# Patient Record
Sex: FEMALE | Race: WHITE | NOT HISPANIC OR LATINO | URBAN - METROPOLITAN AREA
[De-identification: names, ages, dates, MRNs, and addresses within clinical notes are randomized per-mention and may not be internally consistent; named-entity substitution may affect disease eponyms.]

---

## 2020-02-16 ENCOUNTER — INPATIENT (INPATIENT)
Facility: HOSPITAL | Age: 80
LOS: 1 days | Discharge: HOME | DRG: 69 | End: 2020-02-18
Attending: HOSPITALIST | Admitting: HOSPITALIST
Payer: MEDICARE

## 2020-02-16 VITALS
HEART RATE: 62 BPM | OXYGEN SATURATION: 90 % | TEMPERATURE: 98 F | HEIGHT: 68 IN | DIASTOLIC BLOOD PRESSURE: 61 MMHG | SYSTOLIC BLOOD PRESSURE: 106 MMHG | WEIGHT: 164.91 LBS | RESPIRATION RATE: 16 BRPM

## 2020-02-16 DIAGNOSIS — G93.41 METABOLIC ENCEPHALOPATHY: ICD-10-CM

## 2020-02-16 LAB
ADD ON TEST-SPECIMEN IN LAB: SIGNIFICANT CHANGE UP
ADD ON TEST-SPECIMEN IN LAB: SIGNIFICANT CHANGE UP
ALBUMIN SERPL ELPH-MCNC: 3.5 G/DL — SIGNIFICANT CHANGE UP (ref 3.3–5)
ALP SERPL-CCNC: 66 U/L — SIGNIFICANT CHANGE UP (ref 40–120)
ALT FLD-CCNC: 19 U/L — SIGNIFICANT CHANGE UP (ref 12–78)
ANION GAP SERPL CALC-SCNC: 8 MMOL/L — SIGNIFICANT CHANGE UP (ref 5–17)
APTT BLD: 25.7 SEC — LOW (ref 27.5–36.3)
AST SERPL-CCNC: 25 U/L — SIGNIFICANT CHANGE UP (ref 15–37)
BASOPHILS # BLD AUTO: 0.03 K/UL — SIGNIFICANT CHANGE UP (ref 0–0.2)
BASOPHILS NFR BLD AUTO: 0.3 % — SIGNIFICANT CHANGE UP (ref 0–2)
BILIRUB SERPL-MCNC: 0.3 MG/DL — SIGNIFICANT CHANGE UP (ref 0.2–1.2)
BUN SERPL-MCNC: 17 MG/DL — SIGNIFICANT CHANGE UP (ref 7–23)
CALCIUM SERPL-MCNC: 8.9 MG/DL — SIGNIFICANT CHANGE UP (ref 8.5–10.1)
CHLORIDE SERPL-SCNC: 110 MMOL/L — HIGH (ref 96–108)
CK SERPL-CCNC: 225 U/L — HIGH (ref 26–192)
CO2 SERPL-SCNC: 23 MMOL/L — SIGNIFICANT CHANGE UP (ref 22–31)
CREAT SERPL-MCNC: 0.78 MG/DL — SIGNIFICANT CHANGE UP (ref 0.5–1.3)
EOSINOPHIL # BLD AUTO: 0.39 K/UL — SIGNIFICANT CHANGE UP (ref 0–0.5)
EOSINOPHIL NFR BLD AUTO: 3.8 % — SIGNIFICANT CHANGE UP (ref 0–6)
GLUCOSE SERPL-MCNC: 152 MG/DL — HIGH (ref 70–99)
HCT VFR BLD CALC: 41.7 % — SIGNIFICANT CHANGE UP (ref 34.5–45)
HGB BLD-MCNC: 13.9 G/DL — SIGNIFICANT CHANGE UP (ref 11.5–15.5)
IMM GRANULOCYTES NFR BLD AUTO: 0.2 % — SIGNIFICANT CHANGE UP (ref 0–1.5)
INR BLD: 0.93 RATIO — SIGNIFICANT CHANGE UP (ref 0.88–1.16)
LYMPHOCYTES # BLD AUTO: 4.35 K/UL — HIGH (ref 1–3.3)
LYMPHOCYTES # BLD AUTO: 42 % — SIGNIFICANT CHANGE UP (ref 13–44)
MCHC RBC-ENTMCNC: 32.1 PG — SIGNIFICANT CHANGE UP (ref 27–34)
MCHC RBC-ENTMCNC: 33.3 GM/DL — SIGNIFICANT CHANGE UP (ref 32–36)
MCV RBC AUTO: 96.3 FL — SIGNIFICANT CHANGE UP (ref 80–100)
MONOCYTES # BLD AUTO: 1.35 K/UL — HIGH (ref 0–0.9)
MONOCYTES NFR BLD AUTO: 13 % — SIGNIFICANT CHANGE UP (ref 2–14)
NEUTROPHILS # BLD AUTO: 4.21 K/UL — SIGNIFICANT CHANGE UP (ref 1.8–7.4)
NEUTROPHILS NFR BLD AUTO: 40.7 % — LOW (ref 43–77)
PLATELET # BLD AUTO: 198 K/UL — SIGNIFICANT CHANGE UP (ref 150–400)
POTASSIUM SERPL-MCNC: 3.5 MMOL/L — SIGNIFICANT CHANGE UP (ref 3.5–5.3)
POTASSIUM SERPL-SCNC: 3.5 MMOL/L — SIGNIFICANT CHANGE UP (ref 3.5–5.3)
PROT SERPL-MCNC: 7 GM/DL — SIGNIFICANT CHANGE UP (ref 6–8.3)
PROTHROM AB SERPL-ACNC: 10.3 SEC — SIGNIFICANT CHANGE UP (ref 10–12.9)
RBC # BLD: 4.33 M/UL — SIGNIFICANT CHANGE UP (ref 3.8–5.2)
RBC # FLD: 12.6 % — SIGNIFICANT CHANGE UP (ref 10.3–14.5)
SODIUM SERPL-SCNC: 141 MMOL/L — SIGNIFICANT CHANGE UP (ref 135–145)
TROPONIN I SERPL-MCNC: <0.015 NG/ML — SIGNIFICANT CHANGE UP (ref 0.01–0.04)
WBC # BLD: 10.35 K/UL — SIGNIFICANT CHANGE UP (ref 3.8–10.5)
WBC # FLD AUTO: 10.35 K/UL — SIGNIFICANT CHANGE UP (ref 3.8–10.5)

## 2020-02-16 PROCEDURE — 99222 1ST HOSP IP/OBS MODERATE 55: CPT | Mod: AI

## 2020-02-16 PROCEDURE — 80307 DRUG TEST PRSMV CHEM ANLYZR: CPT

## 2020-02-16 PROCEDURE — 82607 VITAMIN B-12: CPT

## 2020-02-16 PROCEDURE — 36415 COLL VENOUS BLD VENIPUNCTURE: CPT

## 2020-02-16 PROCEDURE — 85025 COMPLETE CBC W/AUTO DIFF WBC: CPT

## 2020-02-16 PROCEDURE — 97116 GAIT TRAINING THERAPY: CPT | Mod: GP

## 2020-02-16 PROCEDURE — 71045 X-RAY EXAM CHEST 1 VIEW: CPT | Mod: 26

## 2020-02-16 PROCEDURE — 97162 PT EVAL MOD COMPLEX 30 MIN: CPT | Mod: GP

## 2020-02-16 PROCEDURE — 70450 CT HEAD/BRAIN W/O DYE: CPT | Mod: 26,59

## 2020-02-16 PROCEDURE — 82140 ASSAY OF AMMONIA: CPT

## 2020-02-16 PROCEDURE — 82746 ASSAY OF FOLIC ACID SERUM: CPT

## 2020-02-16 PROCEDURE — 80053 COMPREHEN METABOLIC PANEL: CPT

## 2020-02-16 PROCEDURE — 70498 CT ANGIOGRAPHY NECK: CPT | Mod: 26

## 2020-02-16 PROCEDURE — 84100 ASSAY OF PHOSPHORUS: CPT

## 2020-02-16 PROCEDURE — 70496 CT ANGIOGRAPHY HEAD: CPT | Mod: 26

## 2020-02-16 PROCEDURE — 93010 ELECTROCARDIOGRAM REPORT: CPT

## 2020-02-16 PROCEDURE — 83735 ASSAY OF MAGNESIUM: CPT

## 2020-02-16 PROCEDURE — 95819 EEG AWAKE AND ASLEEP: CPT

## 2020-02-16 PROCEDURE — 85610 PROTHROMBIN TIME: CPT

## 2020-02-16 PROCEDURE — 84484 ASSAY OF TROPONIN QUANT: CPT

## 2020-02-16 PROCEDURE — 93306 TTE W/DOPPLER COMPLETE: CPT

## 2020-02-16 PROCEDURE — 86780 TREPONEMA PALLIDUM: CPT

## 2020-02-16 RX ORDER — SODIUM CHLORIDE 9 MG/ML
500 INJECTION INTRAMUSCULAR; INTRAVENOUS; SUBCUTANEOUS
Refills: 0 | Status: DISCONTINUED | OUTPATIENT
Start: 2020-02-16 | End: 2020-02-18

## 2020-02-16 RX ORDER — ONDANSETRON 8 MG/1
4 TABLET, FILM COATED ORAL ONCE
Refills: 0 | Status: COMPLETED | OUTPATIENT
Start: 2020-02-16 | End: 2020-02-16

## 2020-02-16 RX ADMIN — ONDANSETRON 4 MILLIGRAM(S): 8 TABLET, FILM COATED ORAL at 20:30

## 2020-02-16 NOTE — ED ADULT TRIAGE NOTE - CHIEF COMPLAINT QUOTE
presents with generalized weakness / malaise / nausea, onset began 2 hours ago, from home, patient dryheaving at triage presents with generalized weakness / malaise / nausea, onset began 2 hours ago, as per ems, pt from home, patient dry heaving at triage

## 2020-02-16 NOTE — ED PROVIDER NOTE - PROGRESS NOTE DETAILS
Drew SHELL for ED attending, Dr. Álvarez: Dr. Álvarez on phone with Dr. Chance of Telestroke. Drew SHELL for ED attending, Dr. Álvarez: Received verbal report from radiology which was negative for any acute pathologies. Dr. Álvarez:  CTA verbal report: ? occlusion of small branch off L M1 a. Drew SHELL for ED attending, Dr. Álvarez: Case discussed further with Dr. Chance of Telestroke including CTA verbal report and agrees that she is not tPA candidate nor transfer for neuro interventional radiology. Advise admission for further workup including r/o seizures. Drew SHELL for ED attending, Dr. Álvarez: Case discussed further with Dr. JAKY Chance of Telestroke including CTA verbal report and agrees that she is not tPA candidate nor transfer for neuro-interventional radiology. Advise admission for further workup including r/o seizures. Drew SHELL for ED attending, Dr. Álvarez: Received verbal report from radiology for CT head noncon which was negative for any acute pathologies.

## 2020-02-16 NOTE — ED PROVIDER NOTE - CLINICAL SUMMARY MEDICAL DECISION MAKING FREE TEXT BOX
79 yr old F unknown PMH presenting to the ED BIBA from home regarding general weakness, lethargy. +nausea. Patient denies HA, CP, fever chills, SOB, meds taken today. Patient c/o feeling sick but unable to elaborate. Afebrile, BGM normal on arrival. No facial droop but R arm weakness and b/l leg weakness. Code stroke initiated. Plan: Code stroke, EKG, CXR, CT head, CTA head and neck, labs including serial troponin, NPO dysphagia, NIHSS, Telestroke consulted, IV Zofran for N/V, monitor, observe and reassess.

## 2020-02-16 NOTE — ED ADULT NURSE NOTE - CHIEF COMPLAINT QUOTE
presents with generalized weakness / malaise / nausea, onset began 2 hours ago, from home, patient dryheaving at triage

## 2020-02-16 NOTE — ED ADULT NURSE NOTE - OBJECTIVE STATEMENT
Patient brought in by ambulance with son and grandson.  Patient was seen well at 6pm and she suddenly became lethargic and dropped her head down and was not responding.  She is complaining of extreme nausea.  Patient very lethargic at times and other times is yelling at staff.  Uncooperative at times to perform full assessment.  Patient's family states this mental status is very unlike her.  She is A&Ox1 here but normally as per family is A&Ox4 and self sufficient.

## 2020-02-16 NOTE — ED ADULT NURSE NOTE - NSIMPLEMENTINTERV_GEN_ALL_ED
Implemented All Fall Risk Interventions:  Start to call system. Call bell, personal items and telephone within reach. Instruct patient to call for assistance. Room bathroom lighting operational. Non-slip footwear when patient is off stretcher. Physically safe environment: no spills, clutter or unnecessary equipment. Stretcher in lowest position, wheels locked, appropriate side rails in place. Provide visual cue, wrist band, yellow gown, etc. Monitor gait and stability. Monitor for mental status changes and reorient to person, place, and time. Review medications for side effects contributing to fall risk. Reinforce activity limits and safety measures with patient and family.

## 2020-02-16 NOTE — ED PROVIDER NOTE - CONSTITUTIONAL, MLM
normal... Elderly, white female. Awake but slightly lethargic. In no respiratory discomfort. Acutely ill, non toxic. Elderly, white female. Awake but mildly lethargic. In no respiratory discomfort. Acutely ill, non toxic.  No AOB.

## 2020-02-16 NOTE — ED PROVIDER NOTE - OBJECTIVE STATEMENT
78 y/o F with unknown PMH presenting to the ED c/o sudden onset weakness, lethargy x2 hours. Per family, patient was at home when she started to become suddenly lethargic, and falling asleep when family was talking to her. Pt reports that she just suddenly felt sick, but unable to elaborate an further and does not know what is wrong. +nausea, vomiting Last known well x2 hours, away from baseline. Afebrile on arrival. No medications PTA.  Denies abd pain, SOB, HA, CP, fever or chills. Unable to obtain full HPI secondary to patients condition.

## 2020-02-16 NOTE — STROKE CODE NOTE - SUBJECTIVE
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

## 2020-02-17 LAB
ALBUMIN SERPL ELPH-MCNC: 3.7 G/DL — SIGNIFICANT CHANGE UP (ref 3.3–5)
ALP SERPL-CCNC: 65 U/L — SIGNIFICANT CHANGE UP (ref 40–120)
ALT FLD-CCNC: 15 U/L — SIGNIFICANT CHANGE UP (ref 12–78)
AMMONIA BLD-MCNC: 17 UMOL/L — SIGNIFICANT CHANGE UP (ref 11–32)
ANION GAP SERPL CALC-SCNC: 3 MMOL/L — LOW (ref 5–17)
AST SERPL-CCNC: 19 U/L — SIGNIFICANT CHANGE UP (ref 15–37)
BASOPHILS # BLD AUTO: 0.02 K/UL — SIGNIFICANT CHANGE UP (ref 0–0.2)
BASOPHILS NFR BLD AUTO: 0.2 % — SIGNIFICANT CHANGE UP (ref 0–2)
BILIRUB SERPL-MCNC: 0.3 MG/DL — SIGNIFICANT CHANGE UP (ref 0.2–1.2)
BUN SERPL-MCNC: 14 MG/DL — SIGNIFICANT CHANGE UP (ref 7–23)
CALCIUM SERPL-MCNC: 9.4 MG/DL — SIGNIFICANT CHANGE UP (ref 8.5–10.1)
CHLORIDE SERPL-SCNC: 111 MMOL/L — HIGH (ref 96–108)
CO2 SERPL-SCNC: 28 MMOL/L — SIGNIFICANT CHANGE UP (ref 22–31)
CREAT SERPL-MCNC: 0.67 MG/DL — SIGNIFICANT CHANGE UP (ref 0.5–1.3)
EOSINOPHIL # BLD AUTO: 0.11 K/UL — SIGNIFICANT CHANGE UP (ref 0–0.5)
EOSINOPHIL NFR BLD AUTO: 1.2 % — SIGNIFICANT CHANGE UP (ref 0–6)
FOLATE SERPL-MCNC: 11.6 NG/ML — SIGNIFICANT CHANGE UP
GLUCOSE SERPL-MCNC: 107 MG/DL — HIGH (ref 70–99)
HCT VFR BLD CALC: 42.2 % — SIGNIFICANT CHANGE UP (ref 34.5–45)
HGB BLD-MCNC: 13.9 G/DL — SIGNIFICANT CHANGE UP (ref 11.5–15.5)
IMM GRANULOCYTES NFR BLD AUTO: 0.4 % — SIGNIFICANT CHANGE UP (ref 0–1.5)
INR BLD: 0.94 RATIO — SIGNIFICANT CHANGE UP (ref 0.88–1.16)
LYMPHOCYTES # BLD AUTO: 1.84 K/UL — SIGNIFICANT CHANGE UP (ref 1–3.3)
LYMPHOCYTES # BLD AUTO: 19.8 % — SIGNIFICANT CHANGE UP (ref 13–44)
MAGNESIUM SERPL-MCNC: 2.1 MG/DL — SIGNIFICANT CHANGE UP (ref 1.6–2.6)
MCHC RBC-ENTMCNC: 31.6 PG — SIGNIFICANT CHANGE UP (ref 27–34)
MCHC RBC-ENTMCNC: 32.9 GM/DL — SIGNIFICANT CHANGE UP (ref 32–36)
MCV RBC AUTO: 95.9 FL — SIGNIFICANT CHANGE UP (ref 80–100)
MONOCYTES # BLD AUTO: 1.23 K/UL — HIGH (ref 0–0.9)
MONOCYTES NFR BLD AUTO: 13.2 % — SIGNIFICANT CHANGE UP (ref 2–14)
NEUTROPHILS # BLD AUTO: 6.06 K/UL — SIGNIFICANT CHANGE UP (ref 1.8–7.4)
NEUTROPHILS NFR BLD AUTO: 65.2 % — SIGNIFICANT CHANGE UP (ref 43–77)
PHOSPHATE SERPL-MCNC: 3.7 MG/DL — SIGNIFICANT CHANGE UP (ref 2.5–4.5)
PLATELET # BLD AUTO: 206 K/UL — SIGNIFICANT CHANGE UP (ref 150–400)
POTASSIUM SERPL-MCNC: 4 MMOL/L — SIGNIFICANT CHANGE UP (ref 3.5–5.3)
POTASSIUM SERPL-SCNC: 4 MMOL/L — SIGNIFICANT CHANGE UP (ref 3.5–5.3)
PROT SERPL-MCNC: 7 GM/DL — SIGNIFICANT CHANGE UP (ref 6–8.3)
PROTHROM AB SERPL-ACNC: 10.4 SEC — SIGNIFICANT CHANGE UP (ref 10–12.9)
RBC # BLD: 4.4 M/UL — SIGNIFICANT CHANGE UP (ref 3.8–5.2)
RBC # FLD: 12.6 % — SIGNIFICANT CHANGE UP (ref 10.3–14.5)
SODIUM SERPL-SCNC: 142 MMOL/L — SIGNIFICANT CHANGE UP (ref 135–145)
T PALLIDUM AB TITR SER: NEGATIVE — SIGNIFICANT CHANGE UP
TROPONIN I SERPL-MCNC: <0.015 NG/ML — SIGNIFICANT CHANGE UP (ref 0.01–0.04)
TROPONIN I SERPL-MCNC: <0.015 NG/ML — SIGNIFICANT CHANGE UP (ref 0.01–0.04)
VIT B12 SERPL-MCNC: 578 PG/ML — SIGNIFICANT CHANGE UP (ref 232–1245)
WBC # BLD: 9.3 K/UL — SIGNIFICANT CHANGE UP (ref 3.8–10.5)
WBC # FLD AUTO: 9.3 K/UL — SIGNIFICANT CHANGE UP (ref 3.8–10.5)

## 2020-02-17 PROCEDURE — 99232 SBSQ HOSP IP/OBS MODERATE 35: CPT

## 2020-02-17 PROCEDURE — 99223 1ST HOSP IP/OBS HIGH 75: CPT

## 2020-02-17 PROCEDURE — 93306 TTE W/DOPPLER COMPLETE: CPT | Mod: 26

## 2020-02-17 RX ORDER — ONDANSETRON 8 MG/1
4 TABLET, FILM COATED ORAL EVERY 6 HOURS
Refills: 0 | Status: DISCONTINUED | OUTPATIENT
Start: 2020-02-17 | End: 2020-02-18

## 2020-02-17 RX ORDER — HEPARIN SODIUM 5000 [USP'U]/ML
5000 INJECTION INTRAVENOUS; SUBCUTANEOUS EVERY 12 HOURS
Refills: 0 | Status: DISCONTINUED | OUTPATIENT
Start: 2020-02-17 | End: 2020-02-18

## 2020-02-17 RX ORDER — METOPROLOL TARTRATE 50 MG
100 TABLET ORAL DAILY
Refills: 0 | Status: DISCONTINUED | OUTPATIENT
Start: 2020-02-17 | End: 2020-02-18

## 2020-02-17 RX ORDER — ACETAMINOPHEN 500 MG
650 TABLET ORAL EVERY 4 HOURS
Refills: 0 | Status: DISCONTINUED | OUTPATIENT
Start: 2020-02-17 | End: 2020-02-18

## 2020-02-17 RX ORDER — METOPROLOL TARTRATE 50 MG
1 TABLET ORAL
Qty: 0 | Refills: 0 | DISCHARGE

## 2020-02-17 RX ORDER — ASPIRIN/CALCIUM CARB/MAGNESIUM 324 MG
81 TABLET ORAL DAILY
Refills: 0 | Status: DISCONTINUED | OUTPATIENT
Start: 2020-02-17 | End: 2020-02-18

## 2020-02-17 RX ADMIN — Medication 100 MILLIGRAM(S): at 12:00

## 2020-02-17 RX ADMIN — SODIUM CHLORIDE 100 MILLILITER(S): 9 INJECTION INTRAMUSCULAR; INTRAVENOUS; SUBCUTANEOUS at 01:35

## 2020-02-17 RX ADMIN — HEPARIN SODIUM 5000 UNIT(S): 5000 INJECTION INTRAVENOUS; SUBCUTANEOUS at 17:45

## 2020-02-17 RX ADMIN — ONDANSETRON 4 MILLIGRAM(S): 8 TABLET, FILM COATED ORAL at 04:40

## 2020-02-17 RX ADMIN — Medication 81 MILLIGRAM(S): at 17:45

## 2020-02-17 NOTE — PROGRESS NOTE ADULT - SUBJECTIVE AND OBJECTIVE BOX
Patient is a 79y old  Female who presents with a chief complaint of Encephalopathy (17 Feb 2020 14:40)      SUBJECTIVE:   HPI:  79 year old female patient presented to the ED after being found with profound lethargy at home. Patient states she remembers eating but does not recall events preceding   or following time period. Patient endorses having decreased appetite for the past few days. Patient denies any fever, chills, sob, headache, diarrhea, abdominal, chest pain or palpitations. Patient fully alert and oriented x 3 at the time of evaluation.     In the ED patient was found to have an initial NIH score of 10. Patient was initially seen lethargy but later became verbally agitated with ED staff (17 Feb 2020 02:36)        REVIEW OF SYSTEMS:    CONSTITUTIONAL: No weakness, fevers or chills  EYES/ENT: No visual changes;  No vertigo or throat pain   NECK: No pain or stiffness  RESPIRATORY: No cough, wheezing, hemoptysis; No shortness of breath  CARDIOVASCULAR: No chest pain or palpitations  GASTROINTESTINAL: No abdominal or epigastric pain. No nausea, vomiting, or hematemesis; No diarrhea or constipation. No melena or hematochezia.  GENITOURINARY: No dysuria, frequency or hematuria  NEUROLOGICAL: No numbness or weakness  SKIN: No itching, burning, rashes, or lesions   All other review of systems is negative unless indicated above    Height (cm): 172.72 (02-16 @ 19:51)  Weight (kg): 65.2 (02-17 @ 04:12)  BMI (kg/m2): 21.9 (02-17 @ 04:12)  BSA (m2): 1.78 (02-17 @ 04:12)    Vital Signs Last 24 Hrs  T(C): 36.7 (17 Feb 2020 11:07), Max: 36.7 (17 Feb 2020 00:01)  T(F): 98.1 (17 Feb 2020 11:07), Max: 98.1 (17 Feb 2020 11:07)  HR: 76 (17 Feb 2020 11:07) (62 - 77)  BP: 143/76 (17 Feb 2020 11:07) (106/61 - 143/76)  BP(mean): --  RR: 16 (17 Feb 2020 11:07) (16 - 20)  SpO2: 95% (17 Feb 2020 11:07) (90% - 98%)    I&O's Summary      CAPILLARY BLOOD GLUCOSE      POCT Blood Glucose.: 137 mg/dL (16 Feb 2020 20:09)      PHYSICAL EXAM:    Constitutional: NAD, awake and alert, well-developed  HEENT: PERR, EOMI, Normal Hearing, MMM  Neck: Soft and supple, No LAD, No JVD  Respiratory: Breath sounds are clear bilaterally, No wheezing, rales or rhonchi  Cardiovascular: S1 and S2, regular rate and rhythm, no Murmurs, gallops or rubs  Gastrointestinal: Bowel Sounds present, soft, nontender, nondistended, no guarding, no rebound  Extremities: No peripheral edema  Vascular: 2+ peripheral pulses  Neurological: A/O x 3, no focal deficits  Musculoskeletal: 5/5 strength b/l upper and lower extremities  Skin: No rashes    MEDICATIONS:  MEDICATIONS  (STANDING):  aspirin  chewable 81 milliGRAM(s) Oral daily  heparin  Injectable 5000 Unit(s) SubCutaneous every 12 hours  metoprolol succinate  milliGRAM(s) Oral daily  sodium chloride 0.9%. 500 milliLiter(s) (100 mL/Hr) IV Continuous <Continuous>      LABS: All Labs Reviewed:                        13.9   9.30  )-----------( 206      ( 17 Feb 2020 06:46 )             42.2     02-17    142  |  111<H>  |  14  ----------------------------<  107<H>  4.0   |  28  |  0.67    Ca    9.4      17 Feb 2020 06:46  Phos  3.7     02-17  Mg     2.1     02-17    TPro  7.0  /  Alb  3.7  /  TBili  0.3  /  DBili  x   /  AST  19  /  ALT  15  /  AlkPhos  65  02-17    PT/INR - ( 17 Feb 2020 06:46 )   PT: 10.4 sec;   INR: 0.94 ratio         PTT - ( 16 Feb 2020 20:04 )  PTT:25.7 sec  CARDIAC MARKERS ( 17 Feb 2020 02:52 )  <0.015 ng/mL / x     / x     / x     / x      CARDIAC MARKERS ( 16 Feb 2020 23:27 )  <0.015 ng/mL / x     / x     / x     / x      CARDIAC MARKERS ( 16 Feb 2020 20:04 )  <0.015 ng/mL / x     / 225 U/L / x     / x              Blood Culture:     RADIOLOGY/EKG:  < from: Xray Chest 1 View AP/PA. (02.16.20 @ 21:19) >  IMPRESSION:     Bilateral interstitial opacities.    < end of copied text >    < from: CT Angio Head w/ IV Cont (02.16.20 @ 20:46) >  IMPRESSION:     CTA Neck: No flow-limiting stenosis or evidence of acute dissection within the cervical carotid or vertebral arteries.     CTA Head: Small superiorly oriented branch vessel off of the proximal M1 segment of the left middle cerebral artery is not visualized beyond its proximal segment, and may be occluded. Remainder of the left M1 and sylvian branch vessels are patent.     Dr. Reed discussed the above findings with Dr. Álvarez at 8:59 PM on 2/16/2020 with read back.    < end of copied text >

## 2020-02-17 NOTE — H&P ADULT - NSHPPHYSICALEXAM_GEN_ALL_CORE
Vital Signs Last 24 Hrs  T(C): 36.6 (17 Feb 2020 02:47), Max: 36.7 (17 Feb 2020 00:01)  T(F): 97.9 (17 Feb 2020 02:47), Max: 98 (17 Feb 2020 00:01)  HR: 64 (17 Feb 2020 02:47) (62 - 70)  BP: 116/72 (17 Feb 2020 02:47) (106/61 - 129/71)  BP(mean): --  RR: 16 (17 Feb 2020 02:47) (16 - 20)  SpO2: 98% (17 Feb 2020 02:47) (90% - 98%)

## 2020-02-17 NOTE — CONSULT NOTE ADULT - ASSESSMENT
79 year old F with PMHx of HTN, HLD presented to the ED after being found lethargy / weak and less responsive at home by family, reports she started having dinner, she has a sip of martini, then she put her head down, does not recall what happened after that, per ED physician note, was initially lethargy but later became verbally agitated.  Pt reports a similar episode 2 years ago, started after she had some Martini, no work-up done.    In ED, initial NIHSS 10, was initially lethargy but later became alert and agitated; CT angio head / neck no LVO or acute findings    # Possible seizure vs syncope     - Obtain EEG, if abnormal may obtain ambulatory EEG or consider AED  - MRI brain      D/W Pt 79 year old F with PMHx of HTN, HLD presented to the ED after being found lethargy / weak and less responsive at home by family, reports she started having dinner, she has a sip of martini, then she put her head down, does not recall what happened after that, per ED physician note, was initially lethargy but later became verbally agitated.  Pt reports a similar episode 2 years ago, started after she had some Martini, no work-up done.    In ED, initial NIHSS 10, was initially lethargy but later became alert and agitated; CT angio head / neck no LVO or acute findings    # Possible seizure vs syncope     - Obtain EEG, if abnormal may obtain ambulatory EEG or consider AED    # Left MCA M1 segment possible stenosis / occlusion on CT Angio Head with good distal reconstitution, no focal deficits to suggest acute stroke.      - Recommend MRI brain to rule out stroke      D/W Pt

## 2020-02-17 NOTE — H&P ADULT - ASSESSMENT
79 year old female patient with transient changes in mental status    -Admit to Medsurg      # Encephalopathy  -currently asymptomatic  and back at baseline  -metabolic vs  infectious  -elevated alcohol level noted  -concern for possible seizure  -get morning EEG  -follow up blood and urine cx  -Get  morning EEG  -get tsh, ammonia, vit B12, folate, rpr  -follow up blood and urine cx  -Get neuro consult    #Debility  -PT evaluation

## 2020-02-17 NOTE — CONSULT NOTE ADULT - SUBJECTIVE AND OBJECTIVE BOX
CC: 79y old  Female who presents with a chief complaint of Encephalopathy (17 Feb 2020 02:36)      HPI:  79 year old F presented to the ED after being found with profound lethargy / sudden onset weakness at home, started about 2 hours eralier, per family, patient was at home when she started to become lethargic, was falling asleep when family was talking to her, unable to sustain attention, she remembers eating but does not recall events preceding or following time period.     Patient endorses having decreased appetite for the past few days. Patient denies any fever, chills, headache, diarrhea, or palpitations.     Patient fully alert and oriented x 3 at the time of evaluation.     In ED, initial NIH score of 10, was initially lethargy but later became verbally agitated with ED staff per ED physician note.   CT angio head / neck no LVO or acute findings      PAST MEDICAL & SURGICAL HISTORY:  Unknown      FAMILY HISTORY:      Social Hx:  Nonsmoker, no drug or alcohol use      MEDICATIONS  (STANDING):  heparin  Injectable 5000 Unit(s) SubCutaneous every 12 hours  metoprolol succinate  milliGRAM(s) Oral daily  sodium chloride 0.9%. 500 milliLiter(s) (100 mL/Hr) IV Continuous <Continuous>       Allergies  Allergy Status Unknown    Intolerances      ROS: Pertinent positives in HPI, all other ROS were reviewed and are negative.        Vital Signs Last 24 Hrs  T(C): 36.7 (17 Feb 2020 11:07), Max: 36.7 (17 Feb 2020 00:01)  T(F): 98.1 (17 Feb 2020 11:07), Max: 98.1 (17 Feb 2020 11:07)  HR: 76 (17 Feb 2020 11:07) (62 - 77)  BP: 143/76 (17 Feb 2020 11:07) (106/61 - 143/76)  BP(mean): --  RR: 16 (17 Feb 2020 11:07) (16 - 20)  SpO2: 95% (17 Feb 2020 11:07) (90% - 98%)      Gen exam:  Normocephalic, in no distress, awake and alert.  HEENT: PERRLA, EOMI,   Neck: Supple.  Respiratory: Breath sounds are clear bilaterally  Cardiovascular: S1 and S2, regular  Extremities:  no edema  Vascular: Caritid Bruit - no  Musculoskeletal: no joint swelling/tenderness, no abnormal movements  Skin: No rashes      Neurological exam:  HF: A x O x 3. Appropriately interactive, normal affect. Speech fluent, No Aphasia or paraphasic errors. Naming /repetition intact   CN: DONOVAN, EOMI, VFF, facial sensation normal, no NLFD, tongue midline, Palate moves equally, SCM equal bilaterally  Motor: No pronator drift, Strength 5/5 in all 4 ext, normal bulk and tone, no tremor    Sens: Intact to light touch / PP    Reflexes: Symmetric and normal .  downgoing toes b/l  Coord:  No FNFA, dysmetria, ABE intact   Gait/Balance: Not tested    NIHSS:      Labs:   02-17    142  |  111<H>  |  14  ----------------------------<  107<H>  4.0   |  28  |  0.67    Ca    9.4      17 Feb 2020 06:46  Phos  3.7     02-17  Mg     2.1     02-17    TPro  7.0  /  Alb  3.7  /  TBili  0.3  /  DBili  x   /  AST  19  /  ALT  15  /  AlkPhos  65  02-17                        13.9   9.30  )-----------( 206      ( 17 Feb 2020 06:46 )             42.2       Radiology:  - CT Head / NECK Angio w/ IV Cont (02.16.20 @ 20:46) >  CTA Neck: No flow-limiting stenosis or evidence of acute dissection within the cervical carotid or vertebral arteries.     CTA Head: Small superiorly oriented branch vessel off of the proximal M1 segment of the left middle cerebral artery is not visualized beyond its proximal segment, and may be occluded. Remainder of the left M1 and sylvian branch vessels are patent. CC: 79y old  Female who presents with a chief complaint of Encephalopathy (17 Feb 2020 02:36)      HPI:  79 year old F with PMHx of HTN, HLD presented to the ED after being found lethargy / weak and less responsive at home by family. Pt reports she started having dinner, she has a sip of martini, then she put her head down, does not recall what happened after that, came around in ER, was not confused or dazed, per ED physician note was initially lethargy but later became verbally agitated with ED staff. As per family, patient become lethargic, was falling asleep, less responsive, unable to sustain attention, no seizure activity witnessed.      Pt reporst another similar episode 2 years ago, again started after sh had some Martini, no work-up done.    In ED, initial NIH score 10, was initially lethargy but later became alert and agitated; CT angio head / neck no LVO or acute findings    At present patient is fully alert and oriented x 3, provides history, no HA         PAST MEDICAL & SURGICAL HISTORY:  HTN  HLD      FAMILY HISTORY:  Non - contributory      Social Hx: Lives with son, denies smoking, no IV drug use      MEDICATIONS  (STANDING):  heparin  Injectable 5000 Unit(s) SubCutaneous every 12 hours  metoprolol succinate  milliGRAM(s) Oral daily  sodium chloride 0.9%. 500 milliLiter(s) (100 mL/Hr) IV Continuous <Continuous>       Allergies  Allergy Status Unknown    Intolerances      ROS: Pertinent positives in HPI, all other ROS were reviewed and are negative.        Vital Signs Last 24 Hrs  T(C): 36.7 (17 Feb 2020 11:07), Max: 36.7 (17 Feb 2020 00:01)  T(F): 98.1 (17 Feb 2020 11:07), Max: 98.1 (17 Feb 2020 11:07)  HR: 76 (17 Feb 2020 11:07) (62 - 77)  BP: 143/76 (17 Feb 2020 11:07) (106/61 - 143/76)  BP(mean): --  RR: 16 (17 Feb 2020 11:07) (16 - 20)  SpO2: 95% (17 Feb 2020 11:07) (90% - 98%)      Gen exam:  Normocephalic, in no distress, awake and alert.  HEENT: PERRLA, EOMI,   Neck: Supple.  Respiratory: Breath sounds are clear bilaterally  Cardiovascular: S1 and S2, regular  Extremities:  no edema  Vascular: Caritid Bruit - no  Musculoskeletal: no joint swelling/tenderness, no abnormal movements  Skin: No rashes      Neurological exam:  HF: A x O x 3. Appropriately interactive, normal affect. Speech fluent, No Aphasia or paraphasic errors. Naming /repetition intact   CN: DONOVAN, EOMI, VFF, facial sensation normal, no NLFD, tongue midline, Palate moves equally, SCM equal bilaterally  Motor: No pronator drift, Strength 5/5 in all 4 ext, normal bulk and tone, no tremor    Sens: Intact to light touch / PP    Reflexes: Symmetric and normal .  downgoing toes b/l  Coord:  No FNFA, dysmetria, ABE intact   Gait/Balance: Not tested          Labs:   02-17    142  |  111<H>  |  14  ----------------------------<  107<H>  4.0   |  28  |  0.67    Ca    9.4      17 Feb 2020 06:46  Phos  3.7     02-17  Mg     2.1     02-17    TPro  7.0  /  Alb  3.7  /  TBili  0.3  /  DBili  x   /  AST  19  /  ALT  15  /  AlkPhos  65  02-17                        13.9   9.30  )-----------( 206      ( 17 Feb 2020 06:46 )             42.2       Radiology:  - CT Head / NECK Angio w/ IV Cont (02.16.20 @ 20:46) >  CTA Neck: No flow-limiting stenosis or evidence of acute dissection within the cervical carotid or vertebral arteries.     CTA Head: Small superiorly oriented branch vessel off of the proximal M1 segment of the left middle cerebral artery is not visualized beyond its proximal segment, and may be occluded. Remainder of the left M1 and sylvian branch vessels are patent.

## 2020-02-17 NOTE — PROGRESS NOTE ADULT - ASSESSMENT
79 year old female patient with transient changes in mental status          # Encephalopathy r/o TIA/TGA  -EEG  -MRI  -ASA  -PT on repatha  -CTA: ? poss L MCA occlusion->further reccs from neuro  -concern for possible seizure  -get tsh, ammonia, vit B12, folate, rpr  -follow up blood and urine cx      #Debility  -PT evaluation

## 2020-02-18 ENCOUNTER — TRANSCRIPTION ENCOUNTER (OUTPATIENT)
Age: 80
End: 2020-02-18

## 2020-02-18 VITALS
HEART RATE: 81 BPM | TEMPERATURE: 98 F | SYSTOLIC BLOOD PRESSURE: 137 MMHG | DIASTOLIC BLOOD PRESSURE: 62 MMHG | OXYGEN SATURATION: 97 % | RESPIRATION RATE: 16 BRPM

## 2020-02-18 PROCEDURE — 99232 SBSQ HOSP IP/OBS MODERATE 35: CPT

## 2020-02-18 PROCEDURE — 95819 EEG AWAKE AND ASLEEP: CPT | Mod: 26

## 2020-02-18 PROCEDURE — 99239 HOSP IP/OBS DSCHRG MGMT >30: CPT

## 2020-02-18 RX ORDER — ASPIRIN/CALCIUM CARB/MAGNESIUM 324 MG
1 TABLET ORAL
Qty: 0 | Refills: 0 | DISCHARGE
Start: 2020-02-18

## 2020-02-18 RX ADMIN — Medication 100 MILLIGRAM(S): at 05:27

## 2020-02-18 RX ADMIN — Medication 81 MILLIGRAM(S): at 11:59

## 2020-02-18 RX ADMIN — HEPARIN SODIUM 5000 UNIT(S): 5000 INJECTION INTRAVENOUS; SUBCUTANEOUS at 05:27

## 2020-02-18 NOTE — PHYSICAL THERAPY INITIAL EVALUATION ADULT - PERTINENT HX OF CURRENT PROBLEM, REHAB EVAL
79 year old female patient presented to the ED after being found with profound lethargy at home. Patient states she remembers eating but does not recall events preceding   or following time period. Patient endorses having decreased appetite for the past few days.

## 2020-02-18 NOTE — DISCHARGE NOTE PROVIDER - NSDCCPCAREPLAN_GEN_ALL_CORE_FT
PRINCIPAL DISCHARGE DIAGNOSIS  Diagnosis: Metabolic encephalopathy  Assessment and Plan of Treatment:

## 2020-02-18 NOTE — DISCHARGE NOTE NURSING/CASE MANAGEMENT/SOCIAL WORK - PATIENT PORTAL LINK FT
You can access the FollowMyHealth Patient Portal offered by [facility] by registering at the following website: [email or web address]. By joining [employer]’s FollowMyHealth portal, you will also be able to view your health information using other applications (apps) compatible with our system.

## 2020-02-18 NOTE — PROGRESS NOTE ADULT - ASSESSMENT
79 year old F with PMHx of HTN, HLD presented to the ED after being found lethargy / weak and less responsive at home by family, reports she started having dinner, she has a sip of martini, then she put her head down, does not recall what happened after that, per ED physician note, was initially lethargy but later became verbally agitated.  Pt reports a similar episode 2 years ago, started after she had some Martini, no work-up done.    In ED, initial NIHSS 10, was initially lethargy but later became alert and agitated; CT angio head / neck no LVO or acute findings    # Possible seizure vs syncope , EEG: no epileptiform activity    # Left MCA M1 segment possible stenosis / occlusion on CT Angio Head with good distal reconstitustion, no focal deficits to suggest acute stroke.     MRI brain was ordered could not be done due to hardware.    - Recommend continue ASA and Repatha for stroke prophylaxis.  - F/U with her neurologist Dr. Lorenz, may need repeat CT angio on 6 months    D/W Pt and Dr. Steiner

## 2020-02-18 NOTE — DISCHARGE NOTE PROVIDER - NSDCMRMEDTOKEN_GEN_ALL_CORE_FT
aspirin 81 mg oral tablet, chewable: 1 tab(s) orally once a day  metoprolol succinate 100 mg oral tablet, extended release: 1 tab(s) orally once a day

## 2020-02-18 NOTE — DISCHARGE NOTE PROVIDER - HOSPITAL COURSE
SUBJECTIVE:     HPI:    79 year old female patient presented to the ED after being found with profound lethargy at home. Patient states she remembers eating but does not recall events preceding   or following time period. Patient endorses having decreased appetite for the past few days. Patient denies any fever, chills, sob, headache, diarrhea, abdominal, chest pain or palpitations. Patient fully alert and oriented x 3 at the time of evaluation.         In the ED patient was found to have an initial NIH score of 10. Patient was initially seen lethargy but later became verbally agitated with ED staff (17 Feb 2020 02:36)    PHYSICAL EXAM:    ICU Vital Signs Last 24 Hrs    T(C): 36.7 (18 Feb 2020 05:23), Max: 36.7 (18 Feb 2020 05:23)    T(F): 98 (18 Feb 2020 05:23), Max: 98 (18 Feb 2020 05:23)    HR: 62 (18 Feb 2020 05:23) (62 - 68)    BP: 117/70 (18 Feb 2020 05:23) (117/70 - 134/74)    BP(mean): --    ABP: --    ABP(mean): --    RR: 17 (18 Feb 2020 05:23) (16 - 17)    SpO2: 95% (18 Feb 2020 05:23) (95% - 99%)        Constitutional: NAD, awake and alert, well-developed    HEENT: PERR, EOMI, Normal Hearing, MMM    Neck: Soft and supple, No LAD, No JVD    Respiratory: Breath sounds are clear bilaterally, No wheezing, rales or rhonchi    Cardiovascular: S1 and S2, regular rate and rhythm, no Murmurs, gallops or rubs    Gastrointestinal: Bowel Sounds present, soft, nontender, nondistended, no guarding, no rebound    Extremities: No peripheral edema    Vascular: 2+ peripheral pulses    Neurological: A/O x 3, no focal deficits    Musculoskeletal: 5/5 strength b/l upper and lower extremities    Skin: No rashes    < from: CT Angio Head w/ IV Cont (02.16.20 @ 20:46) >    IMPRESSION:         CTA Neck: No flow-limiting stenosis or evidence of acute dissection within the cervical carotid or vertebral arteries.         CTA Head: Small superiorly oriented branch vessel off of the proximal M1 segment of the left middle cerebral artery is not visualized beyond its proximal segment, and may be occluded. Remainder of the left M1 and sylvian branch vessels are patent.         Dr. Reed discussed the above findings with Dr. Álvarez at 8:59 PM on 2/16/2020 with read back.        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 poss L MCA occlusion->D/W Dr Bray. Cont statin and ASA , bp control and f/u with Dr Lorenz outpt    DC to home : time : 61 min. PCP notified

## 2020-02-21 DIAGNOSIS — I10 ESSENTIAL (PRIMARY) HYPERTENSION: ICD-10-CM

## 2020-02-21 DIAGNOSIS — R29.710 NIHSS SCORE 10: ICD-10-CM

## 2020-02-21 DIAGNOSIS — E78.5 HYPERLIPIDEMIA, UNSPECIFIED: ICD-10-CM

## 2020-02-21 DIAGNOSIS — G93.41 METABOLIC ENCEPHALOPATHY: ICD-10-CM

## 2020-02-21 DIAGNOSIS — G45.9 TRANSIENT CEREBRAL ISCHEMIC ATTACK, UNSPECIFIED: ICD-10-CM

## 2021-01-15 PROBLEM — Z78.9 OTHER SPECIFIED HEALTH STATUS: Chronic | Status: ACTIVE | Noted: 2020-02-16

## 2021-03-01 ENCOUNTER — APPOINTMENT (OUTPATIENT)
Dept: NEUROLOGY | Facility: CLINIC | Age: 81
End: 2021-03-01
Payer: MEDICARE

## 2021-03-01 VITALS
SYSTOLIC BLOOD PRESSURE: 150 MMHG | WEIGHT: 145 LBS | HEART RATE: 75 BPM | HEIGHT: 59 IN | BODY MASS INDEX: 29.23 KG/M2 | DIASTOLIC BLOOD PRESSURE: 89 MMHG

## 2021-03-01 DIAGNOSIS — Z83.3 FAMILY HISTORY OF DIABETES MELLITUS: ICD-10-CM

## 2021-03-01 DIAGNOSIS — Z83.2 FAMILY HISTORY OF DISEASES OF THE BLOOD AND BLOOD-FORMING ORGANS AND CERTAIN DISORDERS INVOLVING THE IMMUNE MECHANISM: ICD-10-CM

## 2021-03-01 DIAGNOSIS — Z78.9 OTHER SPECIFIED HEALTH STATUS: ICD-10-CM

## 2021-03-01 DIAGNOSIS — Z82.49 FAMILY HISTORY OF ISCHEMIC HEART DISEASE AND OTHER DISEASES OF THE CIRCULATORY SYSTEM: ICD-10-CM

## 2021-03-01 DIAGNOSIS — Z82.0 FAMILY HISTORY OF EPILEPSY AND OTHER DISEASES OF THE NERVOUS SYSTEM: ICD-10-CM

## 2021-03-01 DIAGNOSIS — Z81.1 FAMILY HISTORY OF ALCOHOL ABUSE AND DEPENDENCE: ICD-10-CM

## 2021-03-01 DIAGNOSIS — J84.9 INTERSTITIAL PULMONARY DISEASE, UNSPECIFIED: ICD-10-CM

## 2021-03-01 DIAGNOSIS — E78.5 HYPERLIPIDEMIA, UNSPECIFIED: ICD-10-CM

## 2021-03-01 DIAGNOSIS — Z63.4 DISAPPEARANCE AND DEATH OF FAMILY MEMBER: ICD-10-CM

## 2021-03-01 DIAGNOSIS — I10 ESSENTIAL (PRIMARY) HYPERTENSION: ICD-10-CM

## 2021-03-01 PROBLEM — Z00.00 ENCOUNTER FOR PREVENTIVE HEALTH EXAMINATION: Status: ACTIVE | Noted: 2021-03-01

## 2021-03-01 PROCEDURE — 99213 OFFICE O/P EST LOW 20 MIN: CPT

## 2021-03-01 RX ORDER — ALIROCUMAB 150 MG/ML
INJECTION, SOLUTION SUBCUTANEOUS
Refills: 0 | Status: ACTIVE | COMMUNITY

## 2021-03-01 RX ORDER — OLOPATADINE HCL 1 MG/ML
0.1 SOLUTION/ DROPS OPHTHALMIC
Qty: 5 | Refills: 0 | Status: ACTIVE | COMMUNITY
Start: 2020-09-21

## 2021-03-01 RX ORDER — PNV NO.95/FERROUS FUM/FOLIC AC 28MG-0.8MG
TABLET ORAL
Refills: 0 | Status: ACTIVE | COMMUNITY

## 2021-03-01 RX ORDER — EZETIMIBE 10 MG/1
10 TABLET ORAL
Refills: 0 | Status: ACTIVE | COMMUNITY

## 2021-03-01 RX ORDER — METOPROLOL SUCCINATE 100 MG/1
100 TABLET, EXTENDED RELEASE ORAL
Refills: 0 | Status: ACTIVE | COMMUNITY

## 2021-03-01 RX ORDER — BENZOCAINE/BENZALKONIUM/ALOE/E 5 %-0.13 %
10 CREAM (GRAM) TOPICAL
Refills: 0 | Status: ACTIVE | COMMUNITY

## 2021-03-01 SDOH — SOCIAL STABILITY - SOCIAL INSECURITY: DISSAPEARANCE AND DEATH OF FAMILY MEMBER: Z63.4

## 2021-03-01 NOTE — HISTORY OF PRESENT ILLNESS
[FreeTextEntry1] : I had the pleasure of seeing Mrs. Geno Block in the office today.  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  She is .  She lives with her grandson who suffers from Asperger's syndrome.\par \par Mrs. Block was well until about a year ago when she began noting difficulty with name recall and short-term memory.  This accelerated over the past 6 months.  She forgets recent conversations and events.  Despite this, she is totally independent.  She is able to dress, bathe, toilet herself, pay her bills and drive.  Of note, her father suffered from dementia later in life.\par \par In February 2020 after drinking a martini, she experienced a prolonged episode of loss of consciousness.  She was evaluated at [facility].  CT of the brain was unremarkable.  CT angiography did not reveal any definite abnormalities.  Chest x-ray revealed bilateral pulmonary opacities.  Echocardiogram revealed moderate pulmonary hypertension.  She is followed by Dr. Aramis Bro, a pulmonologist.  She has been treated in the past with steroids.\par \par She denies headaches, visual, swallowing, motor, sensory, gait or sphincteric difficulties.  She is hard of hearing.

## 2021-03-01 NOTE — ASSESSMENT
[FreeTextEntry1] : Mrs. Block is a 80-year-old with mild cognitive impairment, amnestic type.  The cause is uncertain.  Considerations include an early neurodegenerative disorder, cerebrovascular disease, normal aging and reversible causes.  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  If unrevealing, empiric treatment with a cholinesterase inhibitor can be considered.  Further management will depend upon these results and clinical course.

## 2021-03-01 NOTE — REVIEW OF SYSTEMS
[Memory Lapses or Loss] : memory loss [Difficulty with Language] : ~M difficulty with language [Negative] : Heme/Lymph

## 2021-03-01 NOTE — PHYSICAL EXAM
[FreeTextEntry1] : Constitutional:  Patient was well-developed, well-nourished and in no acute distress. \par \par Head:  Normocephalic, atraumatic.  The right tympanic membrane was obscured by cerumen.  The left was clear.\par \par Neck:  Supple with full range of motion. \par \par Cardiovascular:  Cardiac rhythm was regular without murmur. There were no carotid bruits. Peripheral pulses were full and symmetric. \par \par Respiratory:  Lungs were clear. \par \par Abdomen:  Soft and nontender. \par \par Spine:  Nontender. \par \par Skin:  There were no rashes. \par \par NEUROLOGICAL EXAMINATION:\par \par Mental Status: Patient was alert and oriented. Speech was fluent. There was no dysarthria.  He scored a 30 out of 30 on MMSE.\par \par Cranial Nerves: \par \par II: Visual acuity was 20/ 20 -1 in the right eye and 20/20 in the left eye with glasses.  Pupils were surgical, equal and reactive. Visual fields were full. Funduscopic examination was normal. \par \par III, IV, VI:  Eye movements were full without nystagmus.  There was mild left upper lid ptosis.\par \par V: Facial sensation was intact. \par \par VII: Facial strength was normal. \par \par VIII: Hearing was equal. \par \par IX, X: Palatal movement was normal. Phonation was normal. \par \par XI: Sternocleidomastoids and trapezii were normal. \par \par XII: Tongue was midline and movements normal. There was no lingual atrophy or fasciculations. \par \par Motor Examination: Muscle bulk, tone and strength were normal.  There were left greater than right postural tremors.\par \par Sensory Examination: Pinprick, vibration and joint position sense were intact. \par \par Reflexes: DTRs were 2 throughout. \par \par Plantar Responses: Plantar responses were flexor. \par \par Coordination/Cerebellar Function: There was no dysmetria on finger to nose or heel to shin testing. \par \par Gait/Stance: Gait was normal.  Romberg was negative.  Tandem was unsteady.\par

## 2021-03-01 NOTE — CONSULT LETTER
[Dear  ___] : Dear  [unfilled], [Consult Letter:] : I had the pleasure of evaluating your patient, [unfilled]. [Please see my note below.] : Please see my note below. [Consult Closing:] : Thank you very much for allowing me to participate in the care of this patient.  If you have any questions, please do not hesitate to contact me. [Sincerely,] : Sincerely, [FreeTextEntry3] : Napoleon Lorenz MD\par  [DrGarrison  ___] : Dr. MALDONADO

## 2021-03-12 ENCOUNTER — NON-APPOINTMENT (OUTPATIENT)
Age: 81
End: 2021-03-12

## 2021-03-12 ENCOUNTER — LABORATORY RESULT (OUTPATIENT)
Age: 81
End: 2021-03-12

## 2021-03-12 LAB
BASOPHILS # BLD AUTO: 0.04 K/UL
BASOPHILS NFR BLD AUTO: 0.5 %
EOSINOPHIL # BLD AUTO: 0.38 K/UL
EOSINOPHIL NFR BLD AUTO: 4.8 %
ERYTHROCYTE [SEDIMENTATION RATE] IN BLOOD BY WESTERGREN METHOD: 25 MM/HR
HCT VFR BLD CALC: 43.2 %
HGB BLD-MCNC: 14.1 G/DL
IMM GRANULOCYTES NFR BLD AUTO: 0.3 %
LYMPHOCYTES # BLD AUTO: 2.2 K/UL
LYMPHOCYTES NFR BLD AUTO: 27.6 %
MAN DIFF?: NORMAL
MCHC RBC-ENTMCNC: 32.1 PG
MCHC RBC-ENTMCNC: 32.6 GM/DL
MCV RBC AUTO: 98.4 FL
MONOCYTES # BLD AUTO: 1.18 K/UL
MONOCYTES NFR BLD AUTO: 14.8 %
NEUTROPHILS # BLD AUTO: 4.15 K/UL
NEUTROPHILS NFR BLD AUTO: 52 %
PLATELET # BLD AUTO: 198 K/UL
RBC # BLD: 4.39 M/UL
RBC # FLD: 12.5 %
WBC # FLD AUTO: 7.97 K/UL

## 2021-03-13 LAB
ALBUMIN SERPL ELPH-MCNC: 4.4 G/DL
ALP BLD-CCNC: 72 U/L
ALT SERPL-CCNC: 16 U/L
ANION GAP SERPL CALC-SCNC: 11 MMOL/L
AST SERPL-CCNC: 25 U/L
B BURGDOR IGG+IGM SER QL IB: NORMAL
BILIRUB SERPL-MCNC: 0.3 MG/DL
BUN SERPL-MCNC: 22 MG/DL
CALCIUM SERPL-MCNC: 9.6 MG/DL
CHLORIDE SERPL-SCNC: 106 MMOL/L
CO2 SERPL-SCNC: 25 MMOL/L
CREAT SERPL-MCNC: 0.73 MG/DL
CRP SERPL-MCNC: <3 MG/L
FOLATE SERPL-MCNC: >20 NG/ML
GLUCOSE SERPL-MCNC: 106 MG/DL
HCYS SERPL-MCNC: 8.6 UMOL/L
POTASSIUM SERPL-SCNC: 4.7 MMOL/L
PROT SERPL-MCNC: 6.9 G/DL
SODIUM SERPL-SCNC: 142 MMOL/L
T3FREE SERPL-MCNC: 3.16 PG/ML
T4 FREE SERPL-MCNC: 1.2 NG/DL
TSH SERPL-ACNC: 3.62 UIU/ML
VIT B12 SERPL-MCNC: 621 PG/ML

## 2021-03-15 LAB
ANA PAT FLD IF-IMP: ABNORMAL
ANACR T: ABNORMAL
B BURGDOR AB SER-IMP: NEGATIVE
B BURGDOR IGM PATRN SER IB-IMP: NEGATIVE
B BURGDOR18KD IGG SER QL IB: NORMAL
B BURGDOR23KD IGG SER QL IB: NORMAL
B BURGDOR23KD IGM SER QL IB: NORMAL
B BURGDOR28KD IGG SER QL IB: NORMAL
B BURGDOR30KD IGG SER QL IB: PRESENT
B BURGDOR31KD IGG SER QL IB: NORMAL
B BURGDOR39KD IGG SER QL IB: NORMAL
B BURGDOR39KD IGM SER QL IB: NORMAL
B BURGDOR41KD IGG SER QL IB: PRESENT
B BURGDOR41KD IGM SER QL IB: PRESENT
B BURGDOR45KD IGG SER QL IB: NORMAL
B BURGDOR58KD IGG SER QL IB: NORMAL
B BURGDOR66KD IGG SER QL IB: PRESENT
B BURGDOR93KD IGG SER QL IB: NORMAL
T PALLIDUM AB SER QL IA: NEGATIVE

## 2021-03-18 ENCOUNTER — OUTPATIENT (OUTPATIENT)
Dept: OUTPATIENT SERVICES | Facility: HOSPITAL | Age: 81
LOS: 1 days | End: 2021-03-18
Payer: MEDICARE

## 2021-03-18 ENCOUNTER — NON-APPOINTMENT (OUTPATIENT)
Age: 81
End: 2021-03-18

## 2021-03-18 ENCOUNTER — APPOINTMENT (OUTPATIENT)
Dept: MRI IMAGING | Facility: CLINIC | Age: 81
End: 2021-03-18
Payer: MEDICARE

## 2021-03-18 ENCOUNTER — APPOINTMENT (OUTPATIENT)
Dept: NEUROLOGY | Facility: CLINIC | Age: 81
End: 2021-03-18
Payer: MEDICARE

## 2021-03-18 VITALS — TEMPERATURE: 97.3 F

## 2021-03-18 DIAGNOSIS — G31.84 MILD COGNITIVE IMPAIRMENT OF UNCERTAIN OR UNKNOWN ETIOLOGY: ICD-10-CM

## 2021-03-18 PROCEDURE — 70551 MRI BRAIN STEM W/O DYE: CPT

## 2021-03-18 PROCEDURE — G1004: CPT

## 2021-03-18 PROCEDURE — 99072 ADDL SUPL MATRL&STAF TM PHE: CPT

## 2021-03-18 PROCEDURE — 95816 EEG AWAKE AND DROWSY: CPT

## 2021-03-18 PROCEDURE — 70551 MRI BRAIN STEM W/O DYE: CPT | Mod: 26,ME

## 2021-03-19 LAB — METHYLMALONATE SERPL-SCNC: 360 NMOL/L

## 2021-03-24 LAB — PARANEOPLASTIC AB PNL SER: NORMAL

## 2021-05-19 ENCOUNTER — APPOINTMENT (OUTPATIENT)
Dept: NEUROLOGY | Facility: CLINIC | Age: 81
End: 2021-05-19
Payer: MEDICARE

## 2021-05-19 VITALS
WEIGHT: 144 LBS | DIASTOLIC BLOOD PRESSURE: 85 MMHG | HEIGHT: 59 IN | HEART RATE: 64 BPM | BODY MASS INDEX: 29.03 KG/M2 | SYSTOLIC BLOOD PRESSURE: 144 MMHG

## 2021-05-19 PROCEDURE — 99213 OFFICE O/P EST LOW 20 MIN: CPT

## 2021-09-09 ENCOUNTER — APPOINTMENT (OUTPATIENT)
Dept: OPHTHALMOLOGY | Facility: CLINIC | Age: 81
End: 2021-09-09

## 2021-09-20 ENCOUNTER — APPOINTMENT (OUTPATIENT)
Dept: NEUROLOGY | Facility: CLINIC | Age: 81
End: 2021-09-20
Payer: MEDICARE

## 2021-09-20 VITALS
SYSTOLIC BLOOD PRESSURE: 144 MMHG | WEIGHT: 140 LBS | BODY MASS INDEX: 28.22 KG/M2 | HEIGHT: 59 IN | DIASTOLIC BLOOD PRESSURE: 86 MMHG | HEART RATE: 65 BPM

## 2021-09-20 PROCEDURE — 99213 OFFICE O/P EST LOW 20 MIN: CPT

## 2021-09-20 RX ORDER — PREDNISONE 10 MG
TABLET ORAL
Refills: 0 | Status: ACTIVE | COMMUNITY

## 2021-09-20 NOTE — PHYSICAL EXAM
[FreeTextEntry1] : Constitutional:  Patient was well-developed, well-nourished and in no acute distress. \par \par Head:  Normocephalic, atraumatic.  The right tympanic membrane was obscured by cerumen.  The left was clear.\par \par Neck:  Supple with full range of motion. \par \par Cardiovascular:  Cardiac rhythm was regular without murmur. There were no carotid bruits. Peripheral pulses were full and symmetric. \par \par Respiratory:  Lungs were clear. \par \par Abdomen:  Soft and nontender. \par \par Spine:  Nontender. \par \par Skin:  There were no rashes. \par \par NEUROLOGICAL EXAMINATION:\par \par Mental Status: Patient was alert and oriented. Speech was fluent. There was no dysarthria.  She scored 29 out of 30 on MMSE, a one-point decline compared to March 2021\par \par Cranial Nerves: \par \par II: She could finger count bilaterally.  Pupils were surgical, equal and reactive. Visual fields were full. Funduscopic examination was normal. \par \par III, IV, VI:  Eye movements were full without nystagmus.  There was mild left upper lid ptosis.\par \par V: Facial sensation was intact. \par \par VII: Facial strength was normal. \par \par VIII: Hearing was equal. \par \par IX, X: Palatal movement was normal. Phonation was normal. \par \par XI: Sternocleidomastoids and trapezii were normal. \par \par XII: Tongue was midline and movements normal. There was no lingual atrophy or fasciculations. \par \par Motor Examination: Muscle bulk, tone and strength were normal.  There were left greater than right postural tremors.\par \par Sensory Examination: Pinprick, vibration and joint position sense were intact. \par \par Reflexes: DTRs were 2 throughout. \par \par Plantar Responses: Plantar responses were flexor. \par \par Coordination/Cerebellar Function: There was no dysmetria on finger to nose or heel to shin testing. \par \par Gait/Stance: Gait was normal.  Romberg was negative.  Tandem was unsteady.\par

## 2021-09-20 NOTE — ASSESSMENT
[FreeTextEntry1] : Mrs. Block is a 81-year-old with mild cognitive impairment, amnestic type.  The cause is uncertain.  Considerations include an early neurodegenerative disorder, cerebrovascular disease, normal aging and reversible causes.  She also suffers from hypertension, hyperlipidemia, a possible familial tremor and interstitial lung disease.\par \par She is scheduled for an FDG PET scan of the brain soon.  Further management will depend upon that result and her clinical course.

## 2021-09-20 NOTE — HISTORY OF PRESENT ILLNESS
[FreeTextEntry1] : Mrs. Geno Block returned to the office having been last seen on May 19, 2021.  She is a 81 year right-handed patient with mild cognitive impairment, amnestic type.  An early neurodegenerative disorder, cerebrovascular disease, normal aging and reversible causes were considered.  She suffers with hypertension, hyperlipidemia, possible familial tremor and interstitial lung disease.  MRI of the brain revealed mild white matter changes.  Routine EEG was normal and a serologic evaluation was unremarkable.\par \par Mrs. Block was experiencing increasing shortness of breath prompting Dr. Aramis Bro, her pulmonologist to begin prednisone 40 mg/day.  She can now breathe.  She was told that a CT scan did not reveal fibrosis.\par \par She continues to complain of forgetfulness.  She has multiple Post-it notes.  There has been no major progression of symptoms.

## 2021-09-20 NOTE — REVIEW OF SYSTEMS
[Memory Lapses or Loss] : memory loss [Difficulty with Language] : ~M difficulty with language [Shortness Of Breath] : shortness of breath [Negative] : Heme/Lymph

## 2021-09-20 NOTE — CONSULT LETTER
[Dear  ___] : Dear  [unfilled], [Consult Letter:] : I had the pleasure of evaluating your patient, [unfilled]. [Please see my note below.] : Please see my note below. [Consult Closing:] : Thank you very much for allowing me to participate in the care of this patient.  If you have any questions, please do not hesitate to contact me. [Sincerely,] : Sincerely, [DrGarrison  ___] : Dr. MALDONADO [FreeTextEntry3] : Napoleon Lorenz MD\par

## 2021-09-29 ENCOUNTER — OUTPATIENT (OUTPATIENT)
Dept: OUTPATIENT SERVICES | Facility: HOSPITAL | Age: 81
LOS: 1 days | End: 2021-09-29
Payer: MEDICARE

## 2021-09-29 ENCOUNTER — APPOINTMENT (OUTPATIENT)
Dept: NUCLEAR MEDICINE | Facility: CLINIC | Age: 81
End: 2021-09-29
Payer: MEDICARE

## 2021-09-29 DIAGNOSIS — G31.84 MILD COGNITIVE IMPAIRMENT OF UNCERTAIN OR UNKNOWN ETIOLOGY: ICD-10-CM

## 2021-09-29 PROCEDURE — 78608 BRAIN IMAGING (PET): CPT

## 2021-09-29 PROCEDURE — 78608 BRAIN IMAGING (PET): CPT | Mod: 26,MH

## 2021-09-29 PROCEDURE — A9552: CPT

## 2021-09-29 RX ORDER — DONEPEZIL HYDROCHLORIDE 5 MG/1
5 TABLET ORAL
Qty: 90 | Refills: 2 | Status: ACTIVE | COMMUNITY
Start: 2021-09-29 | End: 1900-01-01

## 2022-03-21 ENCOUNTER — APPOINTMENT (OUTPATIENT)
Dept: NEUROLOGY | Facility: CLINIC | Age: 82
End: 2022-03-21
Payer: MEDICARE

## 2022-03-21 VITALS
SYSTOLIC BLOOD PRESSURE: 156 MMHG | HEART RATE: 58 BPM | DIASTOLIC BLOOD PRESSURE: 81 MMHG | HEIGHT: 59 IN | WEIGHT: 133 LBS | BODY MASS INDEX: 26.81 KG/M2

## 2022-03-21 PROCEDURE — 99214 OFFICE O/P EST MOD 30 MIN: CPT

## 2022-03-21 NOTE — ASSESSMENT
[FreeTextEntry1] : Mrs. Block is an 82 year-old with mild cognitive impairment, amnestic type.  FDG PET scan was consistent with early Alzheimer's disease.  She is being appropriately treated with donepezil 5 mg daily.  I suggested gradually increasing her dose to 10 mg daily.  It should be noted that the cholinesterase inhibitors can increase bronchial secretions.  Further management will depend upon her clinical course.

## 2022-03-21 NOTE — PHYSICAL EXAM
[FreeTextEntry1] : Constitutional:  Patient was well-developed, well-nourished and in no acute distress. \par \par Head:  Normocephalic, atraumatic.  The right tympanic membrane was obscured by cerumen.  The left was clear.\par \par Neck:  Supple with full range of motion. \par \par Cardiovascular:  Cardiac rhythm was regular without murmur. There were no carotid bruits. Peripheral pulses were full and symmetric. \par \par Respiratory:  Lungs were clear. \par \par Abdomen:  Soft and nontender. \par \par Spine:  Nontender. \par \par Skin:  There were no rashes. \par \par NEUROLOGICAL EXAMINATION:\par \par Mental Status: Patient was alert and oriented. Speech was fluent. There was no dysarthria.  She again scored 29 out of 30 on MMSE, the same score as her last visit.  She incorrectly stated the date as the 22nd.\par \par Cranial Nerves: \par \par II: She could finger count bilaterally.  Pupils were surgical, equal and reactive. Visual fields were full. Funduscopic examination was normal. \par \par III, IV, VI:  Eye movements were full without nystagmus.  There was mild left upper lid ptosis.\par \par V: Facial sensation was intact. \par \par VII: Facial strength was normal. \par \par VIII: Hearing was equal. \par \par IX, X: Palatal movement was normal. Phonation was normal. \par \par XI: Sternocleidomastoids and trapezii were normal. \par \par XII: Tongue was midline and movements normal. There was no lingual atrophy or fasciculations. \par \par Motor Examination: Muscle bulk, tone and strength were normal.  There were left greater than right postural tremors.\par \par Sensory Examination: Pinprick, vibration and joint position sense were intact. \par \par Reflexes: DTRs were 2 throughout. \par \par Plantar Responses: Plantar responses were flexor. \par \par Coordination/Cerebellar Function: There was no dysmetria on finger to nose or heel to shin testing. \par \par Gait/Stance: Gait was normal.  Romberg was negative.  Tandem was unsteady.\par

## 2022-03-21 NOTE — HISTORY OF PRESENT ILLNESS
[FreeTextEntry1] : Mrs. Geno Block returned to the office having been last seen on September 20, 2021.  She is an 82 year right-handed patient with mild cognitive impairment, amnestic type.  An early neurodegenerative disorder, cerebrovascular disease, normal aging and reversible causes were considered.  She suffers with hypertension, hyperlipidemia, possible familial tremor and interstitial lung disease.  MRI of the brain revealed mild white matter changes.  Routine EEG was normal and a serologic evaluation was unremarkable.  His last seen, she underwent an FDG PET scan of the brain.  That study revealed mild decreased uptake in the right greater than left parietal and temporal lobes and precuneus consistent with an early neurodegenerative disorder likely Alzheimer's disease.\par \par She remains on donepezil 5 mg daily.  She denies any side effects.  She is very forgetful.  She lives alone and is independent.\par \par She is still short of breath but steroids were discontinued.  She is scheduled to see Dr. Aramis Bro soon.\par \par

## 2022-06-08 RX ORDER — DONEPEZIL HYDROCHLORIDE 5 MG/1
5 TABLET ORAL
Qty: 90 | Refills: 3 | Status: ACTIVE | COMMUNITY
Start: 2022-06-08 | End: 1900-01-01

## 2022-06-14 RX ORDER — DONEPEZIL HYDROCHLORIDE 10 MG/1
10 TABLET ORAL DAILY
Qty: 90 | Refills: 3 | Status: ACTIVE | COMMUNITY
Start: 2022-06-14 | End: 1900-01-01

## 2022-09-19 ENCOUNTER — APPOINTMENT (OUTPATIENT)
Dept: NEUROLOGY | Facility: CLINIC | Age: 82
End: 2022-09-19

## 2022-10-20 NOTE — PROGRESS NOTE ADULT - SUBJECTIVE AND OBJECTIVE BOX
Pt fully alert and oriented, no HA, no weakness    EEG done, shows no epileptiform activity    MRI brain could not be done due to hardware    ROS: Pertinent positives as above, all other ROS were reviewed and are negative.        MEDICATIONS  (STANDING):  aspirin  chewable 81 milliGRAM(s) Oral daily  heparin  Injectable 5000 Unit(s) SubCutaneous every 12 hours  metoprolol succinate  milliGRAM(s) Oral daily  sodium chloride 0.9%. 500 milliLiter(s) (100 mL/Hr) IV Continuous <Continuous>  Repatha (as OP)      Vital Signs Last 24 Hrs  T(C): 36.7 (18 Feb 2020 05:23), Max: 36.7 (18 Feb 2020 05:23)  T(F): 98 (18 Feb 2020 05:23), Max: 98 (18 Feb 2020 05:23)  HR: 62 (18 Feb 2020 05:23) (62 - 68)  BP: 117/70 (18 Feb 2020 05:23) (117/70 - 134/74)  BP(mean): --  RR: 17 (18 Feb 2020 05:23) (16 - 17)  SpO2: 95% (18 Feb 2020 05:23) (95% - 99%)      Neurological exam:  HF: A x O x 3. Appropriately interactive, normal affect. Speech fluent, No Aphasia or paraphasic errors. Naming /repetition intact   CN: DONOVAN, EOMI, VFF, facial sensation normal, no NLFD, tongue midline, Palate moves equally, SCM equal bilaterally  Motor: No pronator drift, Strength 5/5 in all 4 ext, normal bulk and tone, no tremor    Sens: Intact to light touch / PP    Reflexes: Symmetric and normal.  downgoing toes b/l  Coord:  No FNFA, dysmetria, ABE intact   Gait/Balance: Not tested    LABS:                        13.9   9.30  )-----------( 206      ( 17 Feb 2020 06:46 )             42.2     02-17    142  |  111<H>  |  14  ----------------------------<  107<H>  4.0   |  28  |  0.67    Ca    9.4      17 Feb 2020 06:46  Phos  3.7     02-17  Mg     2.1     02-17    TPro  7.0  /  Alb  3.7  /  TBili  0.3  /  DBili  x   /  AST  19  /  ALT  15  /  AlkPhos  65  02-17    Radiology report:  - EEG: no epileptiform activity    < from: CT Angio Head w/ IV Cont (02.16.20 @ 20:46) >  IMPRESSION:     CTA Neck: No flow-limiting stenosis or evidence of acute dissection within the cervical carotid or vertebral arteries.     CTA Head: Small superiorly oriented branch vessel off of the proximal M1 segment of the left middle cerebral artery is not visualized beyond its proximal segment, and may be occluded. Remainder of the left M1 and sylvian branch vessels are patent.     Dr. Reed discussed the above findings with Dr. Álvarez at 8:59 PM on 2/16/2020 with read back. placed in chart

## 2023-05-23 ENCOUNTER — APPOINTMENT (OUTPATIENT)
Dept: NEUROLOGY | Facility: CLINIC | Age: 83
End: 2023-05-23
Payer: MEDICARE

## 2023-05-23 VITALS
SYSTOLIC BLOOD PRESSURE: 161 MMHG | HEART RATE: 52 BPM | BODY MASS INDEX: 22.58 KG/M2 | WEIGHT: 112 LBS | HEIGHT: 59 IN | DIASTOLIC BLOOD PRESSURE: 84 MMHG

## 2023-05-23 DIAGNOSIS — G31.84 MILD COGNITIVE IMPAIRMENT, SO STATED: ICD-10-CM

## 2023-05-23 PROCEDURE — 99214 OFFICE O/P EST MOD 30 MIN: CPT

## 2023-05-23 NOTE — ASSESSMENT
[FreeTextEntry1] : Mrs. Block is an 83 year-old with mild cognitive impairment, amnestic type.  Despite a 2021 FDG PET scan suggestive of Alzheimer's disease, she appears cognitively stable.  I am suspicious that her daily nausea might be a side effect of her increased dose of donepezil.  I suggested a 2-week donepezil holiday to determine whether dose symptoms remit.  If so, options include restarting at a lower dose or switching to transdermal rivastigmine.  Further management will depend upon her clinical course.  I suggested close telephone and office follow-up.

## 2023-05-23 NOTE — REVIEW OF SYSTEMS
[Memory Lapses or Loss] : memory loss [Difficulty with Language] : ~M difficulty with language [Shortness Of Breath] : shortness of breath [Negative] : Heme/Lymph [FreeTextEntry7] : Nausea

## 2023-05-23 NOTE — PHYSICAL EXAM
[FreeTextEntry1] : Constitutional:  Patient was well-developed, well-nourished and in no acute distress. \par \par Head:  Normocephalic, atraumatic.  Tympanic membranes were not examined.\par \par Neck:  Supple with full range of motion. \par \par Cardiovascular:  Cardiac rhythm was regular without murmur. There were no carotid bruits. Peripheral pulses were full and symmetric. \par \par Respiratory:  Lungs were clear. \par \par Abdomen:  Soft and nontender. \par \par Spine:  Nontender. \par \par Skin:  There were no rashes. \par \par NEUROLOGICAL EXAMINATION:\par \par Mental Status: Patient was alert and oriented. Speech was fluent. There was no dysarthria.  She scored a perfect 30 on MMSE.\par \par Cranial Nerves: \par \par II: She could finger count bilaterally.  Pupils were surgical, equal and reactive. Visual fields were full. Funduscopic examination was normal. \par \par III, IV, VI:  Eye movements were full without nystagmus.  There was mild left upper lid ptosis.\par \par V: Facial sensation was intact. \par \par VII: Facial strength was normal. \par \par VIII: Hearing was equal. \par \par IX, X: Palatal movement was normal. Phonation was normal. \par \par XI: Sternocleidomastoids and trapezii were normal. \par \par XII: Tongue was midline and movements normal. There was no lingual atrophy or fasciculations. \par \par Motor Examination: Muscle bulk, tone and strength were normal.  There were left greater than right postural tremors.\par \par Sensory Examination: Pinprick, vibration and joint position sense were intact. \par \par Reflexes: DTRs were 2 throughout. \par \par Plantar Responses: Plantar responses were flexor. \par \par Coordination/Cerebellar Function: There was no dysmetria on finger to nose or heel to shin testing. \par \par Gait/Stance: Gait was normal.  Romberg was negative.  Tandem was unsteady.\par

## 2023-05-23 NOTE — HISTORY OF PRESENT ILLNESS
[FreeTextEntry1] : Mrs. Geno Block returned to the office having been last seen on March 21, 2022.  She is an 83 year right-handed patient with mild cognitive impairment, amnestic type.  An early neurodegenerative disorder, cerebrovascular disease, normal aging and reversible causes were considered.  She suffers with hypertension, hyperlipidemia, possible familial tremor and interstitial lung disease.  MRI of the brain revealed mild white matter changes.  Routine EEG was normal and a serologic evaluation was unremarkable.  An FDG PET scan of the brain performed in September 2021 revealed mild decreased uptake in the right greater than left parietal and temporal lobes and precuneus consistent with an early neurodegenerative disorder likely Alzheimer's disease.\par \par When last seen, she was on donepezil 5 mg daily.  She denied any side effects.  She was very forgetful was still living alone and was totally independent.  She scored 29 out of 30 on MMSE.  Last year, she increased her donepezil dose from 5 to 10 mg/day.  Since June 2022, she reports that she experiences daily nausea.  She has resorted to taking Dramamine daily which helps a bit.  She feels that her memory is worse.  She is only occasionally lightheaded.\par \par Medications include Dramamine, donepezil 10 mg daily, metoprolol, and ezetimibe and Repatha.\par \par \par

## 2023-05-23 NOTE — CONSULT LETTER
[Dear  ___] : Dear  [unfilled], [Consult Letter:] : I had the pleasure of evaluating your patient, [unfilled]. [Please see my note below.] : Please see my note below. [Consult Closing:] : Thank you very much for allowing me to participate in the care of this patient.  If you have any questions, please do not hesitate to contact me. [Sincerely,] : Sincerely, [DrGarrison  ___] : Dr. MALDONADO [FreeTextEntry3] : Napoleon Lorenz MD\par  [DrGarrison ___] : Dr. MALDONADO

## 2023-07-19 RX ORDER — DONEPEZIL HYDROCHLORIDE 10 MG/1
10 TABLET ORAL DAILY
Qty: 90 | Refills: 3 | Status: ACTIVE | COMMUNITY
Start: 2023-07-19 | End: 1900-01-01

## 2024-01-25 NOTE — H&P ADULT - [facility]_ALL_[facility]
Anesthesia Pre Eval Note    Anesthesia ROS/Med Hx    Overall Review:  EKG was reviewed and Echo was reviewed       Cardiovascular Review:     Positive for CAD  Positive for hypertension  Positive for hyperlipidemia    End/Other Review:    Positive for obesity class III - 40.00 - 49.99  Additional Results:  EKG:  No results found for this or any previous visit (from the past 4464 hour(s)). Echo:  Ejection Fraction       Date                     Value               Ref Range           Status                11/27/2023               55%                                     Corrected        ----------   ALLERGIES:  No Known Allergies   Last Labs        Component                Value               Date/Time                  WBC                      7.3                 01/08/2024 1649            RBC                      5.43                01/08/2024 1649            HGB                      11.6 (L)            01/08/2024 1649            HCT                      38.7 (L)            01/08/2024 1649            MCV                      71.3 (L)            01/08/2024 1649            MCH                      21.4 (L)            01/08/2024 1649            MCHC                     30.0 (L)            01/08/2024 1649            RDW-CV                   20.3 (H)            01/08/2024 1649            Sodium                   139                 01/08/2024 1649            Potassium                3.7                 01/08/2024 1649            Chloride                 108                 01/08/2024 1649            Carbon Dioxide           30                  01/08/2024 1649            Glucose                  85                  01/08/2024 1649            BUN                      15                  01/08/2024 1649            Creatinine               1.16                01/08/2024 1649            Glomerular Filtrati*     77                  01/08/2024 1649            Calcium                  9.2                 01/08/2024 1649            Patient  PLT                      197                 01/08/2024 1649        Past Medical History:  No date: Coronary artery disease  09/15/2023: COVID-19 virus infection      Comment:  Positive test 9/15/23  No date: Essential (primary) hypertension  Past Surgical History:  05/12/2023: Coronary angiogram - cv  1982: Mass excision      Comment:  Facial mass remval  09/28/2022: Open access colonoscopy  10/13/2023: Resect small intest,singl resec/anas      Comment:  laparoscopy with robot, small bowel resection with mass                excision  2012: Vasectomy   Prior to Admission medications :  Medication imatinib (GLEEVEC) 400 MG tablet, Sig Take 1 tablet by mouth daily. Do not start before January 26, 2024., Start Date 1/26/24, End Date , Taking? Yes, Authorizing Provider Valerie Vazquez MD    Medication amLODIPine (NORVASC) 10 MG tablet, Sig Take 1 tablet by mouth in the morning and 1 tablet in the evening. FOR BLOOD PRESSURE, Start Date 1/8/24, End Date , Taking? Yes, Authorizing Provider Nathen Chawla MD    Medication valsartan-hydrochlorothiazide (DIOVAN-HCT) 320-25 MG per tablet, Sig Take 1 tablet by mouth every morning. FOR BLOOD PRESSURE, Start Date 1/8/24, End Date 7/6/24, Taking? Yes, Authorizing Provider Nathen Chawla MD    Medication imatinib (GLEEVEC) 400 MG tablet, Sig Take 1 tablet by mouth daily. Do not start before December 30, 2023., Start Date 12/30/23, End Date , Taking? Yes, Authorizing Provider Valerie Vazquez MD    Medication atorvastatin (LIPITOR) 80 MG tablet, Sig Take 1 tablet by mouth daily., Start Date 8/31/23, End Date , Taking? Yes, Authorizing Provider Nathen Chawla MD    Medication aspirin (ECOTRIN) 81 MG EC tablet, Sig Take 1 tablet by mouth daily., Start Date 8/31/23, End Date , Taking? Yes, Authorizing Provider Nathen Chawla MD    Medication gabapentin (NEURONTIN) 300 MG capsule, Sig Take 1 capsule by mouth nightly., Start Date 11/2/23, End Date 1/8/24,  Taking? , Authorizing Provider Sarika Basurto MD    Medication VITAMIN D, CHOLECALCIFEROL, PO, Sig Take 1 Dose by mouth daily., Start Date , End Date , Taking? , Authorizing Provider Provider, Outside         No data found.  Social history reviewed:  Social History     Tobacco Use   Smoking Status Never   Smokeless Tobacco Never        E-Cigarette/Vaping Substances & Devices    E-Cigarette/Vaping Use Never Used     Nicotine No     THC No     CBD No     Flavoring No     Disposable No     Pre-filled or Refillable Cartridge No     Refillable Tank No     Pre-filled Pod No        Social History     Substance and Sexual Activity   Alcohol Use Yes    Alcohol/week: 1.0 standard drink of alcohol    Types: 1 Standard drinks or equivalent per week    Comment: occasional           Relevant Problems   No relevant active problems       Physical Exam     Airway   Mallampati: II  TM Distance: >3 FB  Neck ROM: Full  Neck: Non-tender and Able to place in sniff position  TMJ Mobility: Good    Cardiovascular  Cardiovascular exam normal  Cardio Rhythm: Regular  Cardio Rate: Normal    Head Assessment  Head assessment: Normocephalic and Atraumatic    General Assessment  General Assessment: Alert and oriented and No acute distress    Dental Exam  Dental exam normal    Pulmonary Exam  Pulmonary exam normal  Breath sounds clear to auscultation:  Yes    Abdominal Exam  Abdominal exam normal      Anesthesia Plan:    ASA Status: 3  Anesthesia Type: General    Induction: Intravenous  Preferred Airway Type: ETT  Maintenance: Inhalational    Post-op Pain Management: Per Surgeon      Checklist  Reviewed: NPO Status, Allergies, Medications, Problem list and Past Med History  Consent/Risks Discussed Statement:  The proposed anesthetic plan, including its risks and benefits, have been discussed with the Patient along with the risks and benefits of alternatives. Questions were encouraged and answered and the patient and/or representative  understands and agrees to proceed.        I discussed with the patient (and/or patient's legal representative) the risks and benefits of the proposed anesthesia plan, General, which may include services performed by other anesthesia providers.    Alternative anesthesia plans, if available, were reviewed with the patient (and/or patient's legal representative). Discussion has been held with the patient (and/or patient's legal representative) regarding risks of anesthesia, which include Nausea, Vomiting and Dental Injury and emergent situations that may require change in anesthesia plan.    The patient (and/or patient's legal representative) has indicated understanding, his/her questions have been answered, and he/she wishes to proceed with the planned anesthetic.    Blood Products: Not Anticipated

## 2025-02-03 ENCOUNTER — APPOINTMENT (OUTPATIENT)
Dept: NEUROLOGY | Facility: CLINIC | Age: 85
End: 2025-02-03
Payer: MEDICARE

## 2025-02-03 VITALS
HEIGHT: 59 IN | SYSTOLIC BLOOD PRESSURE: 145 MMHG | HEART RATE: 92 BPM | WEIGHT: 112 LBS | BODY MASS INDEX: 22.58 KG/M2 | DIASTOLIC BLOOD PRESSURE: 92 MMHG

## 2025-02-03 DIAGNOSIS — G31.84 MILD COGNITIVE IMPAIRMENT, SO STATED: ICD-10-CM

## 2025-02-03 PROCEDURE — 99214 OFFICE O/P EST MOD 30 MIN: CPT

## 2025-02-03 RX ORDER — DONEPEZIL HYDROCHLORIDE 5 MG/1
5 TABLET ORAL
Qty: 90 | Refills: 3 | Status: ACTIVE | COMMUNITY
Start: 2025-02-03 | End: 1900-01-01

## 2025-02-07 ENCOUNTER — NON-APPOINTMENT (OUTPATIENT)
Age: 85
End: 2025-02-07

## 2025-02-07 ENCOUNTER — APPOINTMENT (OUTPATIENT)
Dept: MRI IMAGING | Facility: CLINIC | Age: 85
End: 2025-02-07
Payer: MEDICARE

## 2025-02-07 ENCOUNTER — OUTPATIENT (OUTPATIENT)
Dept: OUTPATIENT SERVICES | Facility: HOSPITAL | Age: 85
LOS: 1 days | End: 2025-02-07
Payer: MEDICARE

## 2025-02-07 DIAGNOSIS — G31.84 MILD COGNITIVE IMPAIRMENT OF UNCERTAIN OR UNKNOWN ETIOLOGY: ICD-10-CM

## 2025-02-07 PROCEDURE — 76377 3D RENDER W/INTRP POSTPROCES: CPT

## 2025-02-07 PROCEDURE — A9585: CPT

## 2025-02-07 PROCEDURE — 70551 MRI BRAIN STEM W/O DYE: CPT

## 2025-02-07 PROCEDURE — 76377 3D RENDER W/INTRP POSTPROCES: CPT | Mod: 26

## 2025-02-07 PROCEDURE — 70551 MRI BRAIN STEM W/O DYE: CPT | Mod: 26

## 2025-02-13 ENCOUNTER — LABORATORY RESULT (OUTPATIENT)
Age: 85
End: 2025-02-13

## 2025-02-14 ENCOUNTER — NON-APPOINTMENT (OUTPATIENT)
Age: 85
End: 2025-02-14

## 2025-02-14 LAB
ALBUMIN SERPL ELPH-MCNC: 4.4 G/DL
ALP BLD-CCNC: 65 U/L
ALT SERPL-CCNC: 12 U/L
ANION GAP SERPL CALC-SCNC: 13 MMOL/L
AST SERPL-CCNC: 19 U/L
BASOPHILS # BLD AUTO: 0.05 K/UL
BASOPHILS NFR BLD AUTO: 0.5 %
BILIRUB SERPL-MCNC: 0.4 MG/DL
BUN SERPL-MCNC: 27 MG/DL
CALCIUM SERPL-MCNC: 10.3 MG/DL
CHLORIDE SERPL-SCNC: 101 MMOL/L
CO2 SERPL-SCNC: 30 MMOL/L
CREAT SERPL-MCNC: 0.79 MG/DL
EGFR: 75 ML/MIN/1.73M2
EOSINOPHIL # BLD AUTO: 0.27 K/UL
EOSINOPHIL NFR BLD AUTO: 2.8 %
FOLATE SERPL-MCNC: 11.5 NG/ML
GLUCOSE SERPL-MCNC: 89 MG/DL
HCT VFR BLD CALC: 42.7 %
HCYS SERPL-MCNC: 14.6 UMOL/L
HGB BLD-MCNC: 13.6 G/DL
IMM GRANULOCYTES NFR BLD AUTO: 0.1 %
LYMPHOCYTES # BLD AUTO: 3.38 K/UL
LYMPHOCYTES NFR BLD AUTO: 35.1 %
MAN DIFF?: NORMAL
MCHC RBC-ENTMCNC: 31.9 G/DL
MCHC RBC-ENTMCNC: 31.9 PG
MCV RBC AUTO: 100 FL
MONOCYTES # BLD AUTO: 1.42 K/UL
MONOCYTES NFR BLD AUTO: 14.7 %
NEUTROPHILS # BLD AUTO: 4.5 K/UL
NEUTROPHILS NFR BLD AUTO: 46.8 %
PLATELET # BLD AUTO: 216 K/UL
POTASSIUM SERPL-SCNC: 3.8 MMOL/L
PROT SERPL-MCNC: 7.3 G/DL
RBC # BLD: 4.27 M/UL
RBC # FLD: 12.5 %
SODIUM SERPL-SCNC: 144 MMOL/L
T PALLIDUM AB SER QL IA: NEGATIVE
TSH SERPL-ACNC: 5 UIU/ML
VIT B12 SERPL-MCNC: 533 PG/ML
WBC # FLD AUTO: 9.63 K/UL

## 2025-03-10 ENCOUNTER — APPOINTMENT (OUTPATIENT)
Dept: NUCLEAR MEDICINE | Facility: CLINIC | Age: 85
End: 2025-03-10
